# Patient Record
Sex: FEMALE | Race: BLACK OR AFRICAN AMERICAN
[De-identification: names, ages, dates, MRNs, and addresses within clinical notes are randomized per-mention and may not be internally consistent; named-entity substitution may affect disease eponyms.]

---

## 2019-10-02 ENCOUNTER — HOSPITAL ENCOUNTER (INPATIENT)
Dept: HOSPITAL 74 - YASAS | Age: 44
LOS: 9 days | Discharge: HOME | DRG: 772 | End: 2019-10-11
Attending: NEUROMUSCULOSKELETAL MEDICINE & OMM | Admitting: NEUROMUSCULOSKELETAL MEDICINE & OMM
Payer: COMMERCIAL

## 2019-10-02 VITALS — BODY MASS INDEX: 22.3 KG/M2

## 2019-10-02 DIAGNOSIS — F12.20: ICD-10-CM

## 2019-10-02 DIAGNOSIS — F32.9: ICD-10-CM

## 2019-10-02 DIAGNOSIS — F11.20: Primary | ICD-10-CM

## 2019-10-02 DIAGNOSIS — C34.90: ICD-10-CM

## 2019-10-02 DIAGNOSIS — R21: ICD-10-CM

## 2019-10-02 PROCEDURE — HZ42ZZZ GROUP COUNSELING FOR SUBSTANCE ABUSE TREATMENT, COGNITIVE-BEHAVIORAL: ICD-10-PCS | Performed by: ALLERGY & IMMUNOLOGY

## 2019-10-02 RX ADMIN — NICOTINE POLACRILEX PRN MG: 2 GUM, CHEWING ORAL at 21:12

## 2019-10-02 RX ADMIN — HYDROXYZINE PAMOATE PRN MG: 25 CAPSULE ORAL at 21:11

## 2019-10-02 RX ADMIN — Medication SCH MG: at 21:11

## 2019-10-02 NOTE — HP
CIWA Score





- Admission Criteria


[facility] Guidelines: Admission for Medically Managed Detox: 


Requires at least one of the followin. CIWA greater than 12


2. Seizures within the past 24 hours


3. Delirium tremens within the past 24 hours


4. Hallucinations within the past 24 hours


5. Acute intervention needed for co  occurring medical disorder


6. Acute intervention needed for co  occurring psychiatric disorder


7. Severe withdrawal that cannot be handled at a lower level of care (continued


    vomiting, continued diarrhea, abnormal vital signs) requiring intravenous


    medication and/or fluids


8. Pregnancy








Admission ROS [facility]





- HPI


Chief Complaint: 





here for rehab


Allergies/Adverse Reactions: 


 Allergies











Allergy/AdvReac Type Severity Reaction Status Date / Time


 


No Known Allergies Allergy   Verified 10/02/19 16:17











History of Present Illness: 





45 yo with stage 4 lung cancer- smoker, with opioid use disorder. Is in a MAT-

methadone program-120mg. Continues to use- 5 bags [facility] heroin/day. Uses occ 

marijuana. Would like to stop marijuana use and heroin use.





Had upper back surgery to repair vetebrae from metastatic cancer. Pt then had 

radiation therapy. Now goes to chemotherapy-(Immunotherapy)  last treatment 

. Next one due this week- pt to reschedule.  PCP- [facility]





Utox: THC, Mop, oxy, MTD.





 





- Ebola screening


Have you traveled outside of the country in the last 21 days: No


Have you had contact with anyone from an Ebola affected area: No





- Review of Systems


Constitutional: No Symptoms Reported


EENT: reports: No Symptoms Reported


Respiratory: reports: No Symptoms reported


Cardiac: reports: No Symptoms Reported


GI: reports: No Symptoms Reported


: reports: No Symptoms Reported


Musculoskeletal: reports: No Symptoms Reported


Integumentary: reports: No Symptoms Reported


Neuro: reports: No Symptoms reported


Endocrine: reports: No Symptoms Reported


Hematology: reports: No Symptoms Reported


Psychiatric: reports: No Sypmtoms Reported


Other Systems: Reviewed and Negative





Patient History





- Patient Medical History


Hx Cancer: Yes (lung cancer- stage 4, getting immunotherapy)





- Smoking Cessation


Smoking history: Current every day smoker


Have you smoked in the past 12 months: Yes


Aproximately how many cigarettes per day: 10


Hx Chewing Tobacco Use: Yes


Initiated information on smoking cessation: Yes


'Breaking Loose' booklet given: 10/02/19





- Substances abused


  ** Heroin


Substance route: Inhalation


Frequency: Daily


Amount used: 50 dollars


Age of first use: 37


Date of last use: 19





Admission Physical Exam [facility]





- Vital Signs


Vital Signs: 


 Vital Signs - 24 hr











  10/02/19





  16:16


 


Temperature 98.8 F


 


Pulse Rate 95 H


 


Respiratory 16





Rate 


 


Blood Pressure 129/66














- Physical


General Appearance: Yes: Within Normal Limits


HEENTM: Yes: Within Normal Limits, EOMI, Hearing grossly Normal


Respiratory: Yes: Within Normal Limits, Chest Non-Tender


Neck: Yes: Within Normal Limits, No masses,lesions,Nodules


Cardiology: Yes: Within Normal Limits, Regular Rhythm, Regular Rate


Abdominal: Yes: Within Normal Limits, Normal Bowel Sounds


Back: Yes: Within Normal Limits


Musculoskeletal: Yes: Within Normal Limits, Muscle weakness


Neurological: Yes: Within Normal Limits


Integumentary: Yes: Within Normal Limits


Lymphatic: Yes: Within Normal Limits





- Diagnostic


(1) Opioid use disorder


Current Visit: Yes   Status: Acute   





(2) Lung cancer, primary, with metastasis from lung to other site


Current Visit: Yes   Status: Acute   





Breathalyzer





- Breathalyzer


Breathalyzer: 0





Urine Drug Screen





- Test Device


Lot number: [id number]


Expiration date: 21





- Control


Is test valid?: Yes





- Results


Drug screen NEGATIVE: No


Urine drug screen results: THC-Marijuana, MOP-Opiates, OXY-Oxycodone, MTD-

Methadone





Inpatient Rehab Admission





- Rehab Decision to Admit


Inpatient rehab admission?: Yes





- Initial Determination


Are CD services needed?: Yes


Free of communicable disease: Yes


Not in need of hospitalization: Yes





- Rehab Admission Criteria


Previous failed treatment: Yes


Poor recovery environment: Yes


Comorbidities: Yes


Lacks judgement: Yes


Patient is meeting Inpatient Rehab admission criteria:: Yes (pt in methadone 

program. Uses THC)

## 2019-10-03 LAB
ALBUMIN SERPL-MCNC: 2.8 G/DL (ref 3.4–5)
ALP SERPL-CCNC: 134 U/L (ref 45–117)
ALT SERPL-CCNC: 12 U/L (ref 13–61)
ANION GAP SERPL CALC-SCNC: 6 MMOL/L (ref 8–16)
APPEARANCE UR: (no result)
AST SERPL-CCNC: 9 U/L (ref 15–37)
BACTERIA # UR AUTO: 132.5 /HPF
BILIRUB SERPL-MCNC: 0.3 MG/DL (ref 0.2–1)
BILIRUB UR STRIP.AUTO-MCNC: NEGATIVE MG/DL
BUN SERPL-MCNC: 9.7 MG/DL (ref 7–18)
CALCIUM SERPL-MCNC: 8.8 MG/DL (ref 8.5–10.1)
CASTS URNS QL MICRO: 2 /LPF (ref 0–8)
CHLORIDE SERPL-SCNC: 109 MMOL/L (ref 98–107)
CO2 SERPL-SCNC: 29 MMOL/L (ref 21–32)
COLOR UR: YELLOW
CREAT SERPL-MCNC: 0.8 MG/DL (ref 0.55–1.3)
CRYSTALS URNS QL MICRO: (no result) /HPF
DEPRECATED RDW RBC AUTO: 14.8 % (ref 11.6–15.6)
EPITH CASTS URNS QL MICRO: 22.1 /HPF
GLUCOSE SERPL-MCNC: 78 MG/DL (ref 74–106)
HCT VFR BLD CALC: 37 % (ref 32.4–45.2)
HGB BLD-MCNC: 12.1 GM/DL (ref 10.7–15.3)
KETONES UR QL STRIP: (no result)
LEUKOCYTE ESTERASE UR QL STRIP.AUTO: (no result)
MCH RBC QN AUTO: 29.6 PG (ref 25.7–33.7)
MCHC RBC AUTO-ENTMCNC: 32.6 G/DL (ref 32–36)
MCV RBC: 90.6 FL (ref 80–96)
NITRITE UR QL STRIP: NEGATIVE
PH UR: 6 [PH] (ref 5–8)
PLATELET # BLD AUTO: 186 K/MM3 (ref 134–434)
PMV BLD: 9.7 FL (ref 7.5–11.1)
POTASSIUM SERPLBLD-SCNC: 4.1 MMOL/L (ref 3.5–5.1)
PROT SERPL-MCNC: 5.9 G/DL (ref 6.4–8.2)
PROT UR QL STRIP: (no result)
PROT UR QL STRIP: NEGATIVE
RBC # BLD AUTO: 2 /HPF (ref 0–4)
RBC # BLD AUTO: 4.09 M/MM3 (ref 3.6–5.2)
SODIUM SERPL-SCNC: 143 MMOL/L (ref 136–145)
SP GR UR: 1.02 (ref 1.01–1.03)
UROBILINOGEN UR STRIP-MCNC: 1 MG/DL (ref 0.2–1)
WBC # BLD AUTO: 2.6 K/MM3 (ref 4–10)
WBC # UR AUTO: 10 /HPF (ref 0–5)

## 2019-10-03 RX ADMIN — Medication PRN MG: at 21:02

## 2019-10-03 RX ADMIN — NICOTINE POLACRILEX PRN MG: 2 GUM, CHEWING ORAL at 12:26

## 2019-10-03 RX ADMIN — Medication SCH MG: at 21:02

## 2019-10-03 RX ADMIN — NICOTINE POLACRILEX PRN MG: 2 GUM, CHEWING ORAL at 09:36

## 2019-10-03 RX ADMIN — HYDROXYZINE PAMOATE PRN MG: 25 CAPSULE ORAL at 12:26

## 2019-10-03 RX ADMIN — Medication SCH TAB: at 09:36

## 2019-10-03 RX ADMIN — METHADONE HYDROCHLORIDE SCH MG: 40 TABLET ORAL at 07:59

## 2019-10-03 RX ADMIN — NICOTINE SCH MG: 14 PATCH, EXTENDED RELEASE TRANSDERMAL at 09:36

## 2019-10-03 RX ADMIN — IBUPROFEN PRN MG: 400 TABLET, FILM COATED ORAL at 09:37

## 2019-10-03 RX ADMIN — MIRTAZAPINE SCH MG: 15 TABLET, FILM COATED ORAL at 21:02

## 2019-10-03 RX ADMIN — NICOTINE POLACRILEX PRN MG: 2 GUM, CHEWING ORAL at 21:03
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
## 2019-10-04 RX ADMIN — NICOTINE POLACRILEX PRN MG: 2 GUM, CHEWING ORAL at 21:25

## 2019-10-04 RX ADMIN — HYDROXYZINE PAMOATE PRN MG: 25 CAPSULE ORAL at 21:23

## 2019-10-04 RX ADMIN — METHADONE HYDROCHLORIDE SCH MG: 40 TABLET ORAL at 06:17

## 2019-10-04 RX ADMIN — Medication PRN MG: at 21:24

## 2019-10-04 RX ADMIN — NICOTINE SCH MG: 14 PATCH, EXTENDED RELEASE TRANSDERMAL at 09:32

## 2019-10-04 RX ADMIN — MIRTAZAPINE SCH MG: 15 TABLET, FILM COATED ORAL at 21:23

## 2019-10-04 RX ADMIN — Medication SCH TAB: at 09:32

## 2019-10-04 RX ADMIN — NICOTINE POLACRILEX PRN MG: 2 GUM, CHEWING ORAL at 09:34

## 2019-10-04 RX ADMIN — Medication SCH MG: at 21:23

## 2019-10-04 NOTE — PN
[facility] Progress Note (SOAP)


Subjective: 


Patient has stage 4 lung cancer; receives chemotherapy every 4 weeks. Her 

appointment for chemotherapy, a c-scan, and medical evaluation is due during 

her stay in rehab. Ms. Grossman states that she is up to date with all her 

oncology treatment program and it will not effect her care if she stays the 2 

weeks in rehab. 





Objective: 


 CBC, BMP





 10/03/19 09:00 





 10/03/19 09:00 





 Vital Signs (72 hours)











  10/02/19 10/02/19 10/03/19





  16:16 20:47 00:30


 


Temperature 98.8 F 98.2 F 


 


Pulse Rate 95 H 96 H 


 


Respiratory 16 18 16





Rate   


 


Blood Pressure 129/66 120/64 














  10/03/19 10/03/19 10/04/19





  03:30 07:34 00:30


 


Temperature  97.9 F 


 


Pulse Rate  99 H 


 


Respiratory 16 18 16





Rate   


 


Blood Pressure  130/90 














  10/04/19 10/04/19





  03:30 06:47


 


Temperature  97.8 F


 


Pulse Rate  87


 


Respiratory 16 18





Rate  


 


Blood Pressure  122/83











10/04/19 10:00





10/04/19 10:01


P/E: 


General: No apparent distress, A+O


HEENTM: normocephalic, 


Neck: supple


skin: clear


Lungs: clear


Heart: regular


Neuro: no neurological deficits, Cn 2-12 intact. 


Assessment: 


Stage 4 lung cancer


10/04/19 10:02





Plan: 


Offered the patient early discharge with an opportunity to return to rehab in 

order to keep her oncology appointment. Consulted with Ms. Yadav about the 

patient, who agreed that patient could be discharged to attend her oncology 

appointment and return to be re-admitted. Patient refused the offer and stated 

that she wanted to complete her 2 week rehab program. Advised patient that if 

any point during her stay, she wanted to arrange to keep her oncology 

appointment to please let her counselor know.  Asked patient to speak to her 

counselor about this issue and accompanied patient to counselor's office. 

Informed Ms. Alberto RN of the same.

## 2019-10-05 RX ADMIN — NICOTINE POLACRILEX PRN MG: 2 GUM, CHEWING ORAL at 09:30

## 2019-10-05 RX ADMIN — Medication SCH MG: at 21:38

## 2019-10-05 RX ADMIN — Medication SCH TAB: at 09:30

## 2019-10-05 RX ADMIN — MIRTAZAPINE SCH MG: 15 TABLET, FILM COATED ORAL at 21:37

## 2019-10-05 RX ADMIN — NICOTINE SCH MG: 14 PATCH, EXTENDED RELEASE TRANSDERMAL at 09:29

## 2019-10-05 RX ADMIN — Medication PRN MG: at 21:38

## 2019-10-05 RX ADMIN — METHADONE HYDROCHLORIDE SCH MG: 40 TABLET ORAL at 06:41

## 2019-10-06 RX ADMIN — NICOTINE SCH MG: 14 PATCH, EXTENDED RELEASE TRANSDERMAL at 09:45

## 2019-10-06 RX ADMIN — NICOTINE POLACRILEX PRN MG: 2 GUM, CHEWING ORAL at 21:45

## 2019-10-06 RX ADMIN — Medication PRN MG: at 21:44

## 2019-10-06 RX ADMIN — METHADONE HYDROCHLORIDE SCH MG: 40 TABLET ORAL at 07:00

## 2019-10-06 RX ADMIN — MIRTAZAPINE SCH MG: 15 TABLET, FILM COATED ORAL at 21:44

## 2019-10-06 RX ADMIN — NICOTINE POLACRILEX PRN MG: 2 GUM, CHEWING ORAL at 09:46

## 2019-10-06 RX ADMIN — Medication SCH TAB: at 09:45

## 2019-10-06 RX ADMIN — Medication SCH MG: at 21:44

## 2019-10-06 RX ADMIN — HYDROXYZINE PAMOATE PRN MG: 25 CAPSULE ORAL at 21:45

## 2019-10-07 RX ADMIN — HYDROCORTISONE SCH APPLIC: 0.5 CREAM TOPICAL at 21:21

## 2019-10-07 RX ADMIN — CYCLOBENZAPRINE HYDROCHLORIDE PRN MG: 10 TABLET, FILM COATED ORAL at 16:49

## 2019-10-07 RX ADMIN — Medication SCH TAB: at 09:55

## 2019-10-07 RX ADMIN — NICOTINE POLACRILEX PRN MG: 2 GUM, CHEWING ORAL at 09:58

## 2019-10-07 RX ADMIN — HYDROXYZINE PAMOATE PRN MG: 25 CAPSULE ORAL at 21:19

## 2019-10-07 RX ADMIN — HYDROXYZINE PAMOATE PRN MG: 25 CAPSULE ORAL at 13:01

## 2019-10-07 RX ADMIN — Medication SCH MG: at 21:19

## 2019-10-07 RX ADMIN — IBUPROFEN PRN MG: 400 TABLET, FILM COATED ORAL at 21:20

## 2019-10-07 RX ADMIN — MIRTAZAPINE SCH MG: 15 TABLET, FILM COATED ORAL at 21:19

## 2019-10-07 RX ADMIN — NICOTINE POLACRILEX PRN MG: 2 GUM, CHEWING ORAL at 13:02

## 2019-10-07 RX ADMIN — NICOTINE SCH MG: 14 PATCH, EXTENDED RELEASE TRANSDERMAL at 09:55

## 2019-10-07 RX ADMIN — Medication PRN MG: at 21:19

## 2019-10-07 RX ADMIN — NICOTINE POLACRILEX PRN MG: 2 GUM, CHEWING ORAL at 21:20

## 2019-10-07 RX ADMIN — METHADONE HYDROCHLORIDE SCH MG: 40 TABLET ORAL at 06:40

## 2019-10-07 NOTE — PN
[facility] Progress Note


Note: 





 Laboratory Tests











  10/02/19 10/03/19 10/03/19





  17:06 08:00 09:00


 


WBC    2.6 L


 


RBC    4.09


 


Hgb    12.1


 


Hct    37.0


 


MCV    90.6


 


MCH    29.6


 


MCHC    32.6


 


RDW    14.8


 


Plt Count    186


 


MPV    9.7


 


Sodium   


 


Potassium   


 


Chloride   


 


Carbon Dioxide   


 


Anion Gap   


 


BUN   


 


Creatinine   


 


Est GFR (CKD-EPI)AfAm   


 


Est GFR (CKD-EPI)NonAf   


 


Random Glucose   


 


Calcium   


 


Total Bilirubin   


 


AST   


 


ALT   


 


Alkaline Phosphatase   


 


Total Protein   


 


Albumin   


 


Urine Color   Yellow 


 


Urine Appearance   Cloudy 


 


Urine pH   6.0 


 


Ur Specific Gravity   1.022 


 


Urine Protein   1+ H 


 


Urine Glucose (UA)   Negative 


 


Urine Ketones   Trace H 


 


Urine Blood   3+ H 


 


Urine Nitrite   Negative 


 


Urine Bilirubin   Negative 


 


Urine Urobilinogen   1.0 


 


Ur Leukocyte Esterase   1+ H 


 


Urine WBC (Auto)   10 


 


Urine RBC (Auto)   2 


 


Urine Casts (Auto)   2 


 


U Pathogenic Cast Auto   None seen 


 


U Epithel Cells (Auto)   22.1 


 


Urine Crystals (Auto)   Calcium oxalate 


 


Urine Bacteria (Auto)   132.5 


 


POC Urine HCG, Qual  Negative  


 


RPR Titer   














  10/03/19 10/03/19





  09:00 09:00


 


WBC  


 


RBC  


 


Hgb  


 


Hct  


 


MCV  


 


MCH  


 


MCHC  


 


RDW  


 


Plt Count  


 


MPV  


 


Sodium  143 


 


Potassium  4.1 


 


Chloride  109 H 


 


Carbon Dioxide  29 


 


Anion Gap  6 L 


 


BUN  9.7 


 


Creatinine  0.8 


 


Est GFR (CKD-EPI)AfAm  103.92 


 


Est GFR (CKD-EPI)NonAf  89.66 


 


Random Glucose  78 


 


Calcium  8.8 


 


Total Bilirubin  0.3 


 


AST  9 L 


 


ALT  12 L 


 


Alkaline Phosphatase  134 H 


 


Total Protein  5.9 L 


 


Albumin  2.8 L 


 


Urine Color  


 


Urine Appearance  


 


Urine pH  


 


Ur Specific Gravity  


 


Urine Protein  


 


Urine Glucose (UA)  


 


Urine Ketones  


 


Urine Blood  


 


Urine Nitrite  


 


Urine Bilirubin  


 


Urine Urobilinogen  


 


Ur Leukocyte Esterase  


 


Urine WBC (Auto)  


 


Urine RBC (Auto)  


 


Urine Casts (Auto)  


 


U Pathogenic Cast Auto  


 


U Epithel Cells (Auto)  


 


Urine Crystals (Auto)  


 


Urine Bacteria (Auto)  


 


POC Urine HCG, Qual  


 


RPR Titer   Nonreactive








 Vital Signs











Temperature  97.9 F   10/07/19 07:13


 


Pulse Rate  97 H  10/07/19 07:13


 


Respiratory Rate  18   10/07/19 07:13


 


Blood Pressure  127/87   10/07/19 07:13


 


O2 Sat by Pulse Oximetry (%)      








Patient c/o itching and rash to arms.





ROS : +itching








PE:





alert and oriented x 3





skin warm and dry, +macular red rash to arms





no open areas








A/P:





UA + blood





Rash








aveeno soap





hydrocortisone cream





repeat ua in am

## 2019-10-08 LAB
APPEARANCE UR: (no result)
BILIRUB UR STRIP.AUTO-MCNC: NEGATIVE MG/DL
COLOR UR: YELLOW
KETONES UR QL STRIP: NEGATIVE
LEUKOCYTE ESTERASE UR QL STRIP.AUTO: NEGATIVE
NITRITE UR QL STRIP: NEGATIVE
PH UR: 8 [PH] (ref 5–8)
PROT UR QL STRIP: NEGATIVE
PROT UR QL STRIP: NEGATIVE
SP GR UR: 1.02 (ref 1.01–1.03)
UROBILINOGEN UR STRIP-MCNC: 0.2 MG/DL (ref 0.2–1)

## 2019-10-08 RX ADMIN — Medication SCH TAB: at 09:50

## 2019-10-08 RX ADMIN — MIRTAZAPINE SCH MG: 15 TABLET, FILM COATED ORAL at 21:18

## 2019-10-08 RX ADMIN — HYDROCORTISONE SCH: 0.5 CREAM TOPICAL at 09:50

## 2019-10-08 RX ADMIN — HYDROXYZINE PAMOATE PRN MG: 25 CAPSULE ORAL at 09:52

## 2019-10-08 RX ADMIN — CYCLOBENZAPRINE HYDROCHLORIDE PRN MG: 10 TABLET, FILM COATED ORAL at 17:50

## 2019-10-08 RX ADMIN — HYDROXYZINE PAMOATE PRN MG: 25 CAPSULE ORAL at 22:37

## 2019-10-08 RX ADMIN — CYCLOBENZAPRINE HYDROCHLORIDE PRN MG: 10 TABLET, FILM COATED ORAL at 09:52

## 2019-10-08 RX ADMIN — NICOTINE SCH MG: 14 PATCH, EXTENDED RELEASE TRANSDERMAL at 09:50

## 2019-10-08 RX ADMIN — Medication SCH MG: at 21:18

## 2019-10-08 RX ADMIN — Medication PRN MG: at 21:18

## 2019-10-08 RX ADMIN — NICOTINE POLACRILEX PRN MG: 2 GUM, CHEWING ORAL at 09:53

## 2019-10-08 RX ADMIN — HYDROXYZINE PAMOATE PRN MG: 25 CAPSULE ORAL at 17:50

## 2019-10-08 RX ADMIN — HYDROCORTISONE SCH: 0.5 CREAM TOPICAL at 21:19

## 2019-10-08 RX ADMIN — METHADONE HYDROCHLORIDE SCH MG: 40 TABLET ORAL at 06:34

## 2019-10-09 RX ADMIN — NICOTINE POLACRILEX PRN MG: 2 GUM, CHEWING ORAL at 18:43

## 2019-10-09 RX ADMIN — Medication SCH TAB: at 09:40

## 2019-10-09 RX ADMIN — CYCLOBENZAPRINE HYDROCHLORIDE PRN MG: 10 TABLET, FILM COATED ORAL at 18:42

## 2019-10-09 RX ADMIN — HYDROXYZINE PAMOATE PRN MG: 25 CAPSULE ORAL at 17:33

## 2019-10-09 RX ADMIN — NICOTINE SCH MG: 14 PATCH, EXTENDED RELEASE TRANSDERMAL at 09:40

## 2019-10-09 RX ADMIN — HYDROCORTISONE SCH APPLIC: 0.5 CREAM TOPICAL at 09:41

## 2019-10-09 RX ADMIN — NICOTINE POLACRILEX PRN MG: 2 GUM, CHEWING ORAL at 21:32

## 2019-10-09 RX ADMIN — NICOTINE POLACRILEX PRN MG: 2 GUM, CHEWING ORAL at 09:44

## 2019-10-09 RX ADMIN — HYDROXYZINE PAMOATE PRN MG: 25 CAPSULE ORAL at 21:29

## 2019-10-09 RX ADMIN — HYDROCORTISONE SCH APPLIC: 0.5 CREAM TOPICAL at 21:36

## 2019-10-09 RX ADMIN — CYCLOBENZAPRINE HYDROCHLORIDE PRN MG: 10 TABLET, FILM COATED ORAL at 09:43

## 2019-10-09 RX ADMIN — HYDROXYZINE PAMOATE PRN MG: 25 CAPSULE ORAL at 09:43

## 2019-10-09 RX ADMIN — METHADONE HYDROCHLORIDE SCH MG: 40 TABLET ORAL at 06:22

## 2019-10-09 RX ADMIN — Medication SCH MG: at 21:30

## 2019-10-09 RX ADMIN — Medication PRN MG: at 21:29

## 2019-10-09 RX ADMIN — MIRTAZAPINE SCH MG: 15 TABLET, FILM COATED ORAL at 21:29

## 2019-10-10 RX ADMIN — NICOTINE POLACRILEX PRN MG: 2 GUM, CHEWING ORAL at 19:01

## 2019-10-10 RX ADMIN — HYDROXYZINE PAMOATE PRN MG: 25 CAPSULE ORAL at 19:00

## 2019-10-10 RX ADMIN — Medication SCH TAB: at 09:43

## 2019-10-10 RX ADMIN — CYCLOBENZAPRINE HYDROCHLORIDE PRN MG: 10 TABLET, FILM COATED ORAL at 19:00

## 2019-10-10 RX ADMIN — HYDROCORTISONE SCH: 0.5 CREAM TOPICAL at 09:44

## 2019-10-10 RX ADMIN — NICOTINE POLACRILEX PRN MG: 2 GUM, CHEWING ORAL at 09:46

## 2019-10-10 RX ADMIN — HYDROCORTISONE SCH: 0.5 CREAM TOPICAL at 22:09

## 2019-10-10 RX ADMIN — Medication SCH MG: at 21:24

## 2019-10-10 RX ADMIN — NICOTINE POLACRILEX PRN MG: 2 GUM, CHEWING ORAL at 21:25

## 2019-10-10 RX ADMIN — HYDROXYZINE PAMOATE PRN MG: 25 CAPSULE ORAL at 09:47

## 2019-10-10 RX ADMIN — NICOTINE SCH MG: 14 PATCH, EXTENDED RELEASE TRANSDERMAL at 09:43

## 2019-10-10 RX ADMIN — METHADONE HYDROCHLORIDE SCH MG: 40 TABLET ORAL at 06:31

## 2019-10-10 RX ADMIN — CYCLOBENZAPRINE HYDROCHLORIDE PRN MG: 10 TABLET, FILM COATED ORAL at 09:45

## 2019-10-10 RX ADMIN — MIRTAZAPINE SCH MG: 15 TABLET, FILM COATED ORAL at 21:24

## 2019-10-10 RX ADMIN — Medication PRN MG: at 21:24

## 2019-10-11 VITALS — TEMPERATURE: 97.6 F | SYSTOLIC BLOOD PRESSURE: 131 MMHG | DIASTOLIC BLOOD PRESSURE: 86 MMHG | HEART RATE: 100 BPM

## 2019-10-11 RX ADMIN — METHADONE HYDROCHLORIDE SCH MG: 40 TABLET ORAL at 06:21

## 2019-10-11 NOTE — DS
[facility] Rehab Discharge Summary





- [facility] Rehab Discharge Summary


Admission Date: 10/02/19


Discharge Date: 10/11/19





- History


Present History: Cannabis dependence, MMTP, Opioid dependence


Additional Comments: 





Pt is a 45 y/o female admitted to rehab with LUISANA and discharged today. Pt will 

be following up with her MMTP at [facility] to continue 

maintenance. Pt reports she has a primary care doctor Dr. Forte for  medical 

management as well as oncology care at [place], [place]. Reports 

her next appointment is on 10/16/19. 


Pertinent Past History: 





Hx Lung Cancer


MDD





- Discharge Physical Exam


Vital Signs: 


 Vital Signs











Temperature  97.6 F   10/11/19 07:18


 


Pulse Rate  100 H  10/11/19 07:18


 


Respiratory Rate  18   10/11/19 07:18


 


Blood Pressure  131/86   10/11/19 07:18


 


O2 Sat by Pulse Oximetry (%)      








Alert o x 3


nad


oob 


Cardiac:s1 s2, rrr


Lungs:cta,ravi. 


Abdomen:soft,+bs,nt,nd


Extremities/Skin:no edema,Full ROM,skin intact.





Pertinent Admission Physical Exam Findings: 





 Laboratory Tests











  10/02/19 10/03/19 10/03/19





  17:06 08:00 09:00


 


WBC    2.6 L


 


RBC    4.09


 


Hgb    12.1


 


Hct    37.0


 


MCV    90.6


 


MCH    29.6


 


MCHC    32.6


 


RDW    14.8


 


Plt Count    186


 


MPV    9.7


 


Sodium   


 


Potassium   


 


Chloride   


 


Carbon Dioxide   


 


Anion Gap   


 


BUN   


 


Creatinine   


 


Est GFR (CKD-EPI)AfAm   


 


Est GFR (CKD-EPI)NonAf   


 


Random Glucose   


 


Calcium   


 


Total Bilirubin   


 


AST   


 


ALT   


 


Alkaline Phosphatase   


 


Total Protein   


 


Albumin   


 


Urine Color   Yellow 


 


Urine Appearance   Cloudy 


 


Urine pH   6.0 


 


Ur Specific Gravity   1.022 


 


Urine Protein   1+ H 


 


Urine Glucose (UA)   Negative 


 


Urine Ketones   Trace H 


 


Urine Blood   3+ H 


 


Urine Nitrite   Negative 


 


Urine Bilirubin   Negative 


 


Urine Urobilinogen   1.0 


 


Ur Leukocyte Esterase   1+ H 


 


Urine WBC (Auto)   10 


 


Urine RBC (Auto)   2 


 


Urine Casts (Auto)   2 


 


U Pathogenic Cast Auto   None seen 


 


U Epithel Cells (Auto)   22.1 


 


Urine Crystals (Auto)   Calcium oxalate 


 


Urine Bacteria (Auto)   132.5 


 


POC Urine HCG, Qual  Negative  


 


RPR Titer   














  10/03/19 10/03/19 10/08/19





  09:00 09:00 10:15


 


WBC   


 


RBC   


 


Hgb   


 


Hct   


 


MCV   


 


MCH   


 


MCHC   


 


RDW   


 


Plt Count   


 


MPV   


 


Sodium  143  


 


Potassium  4.1  


 


Chloride  109 H  


 


Carbon Dioxide  29  


 


Anion Gap  6 L  


 


BUN  9.7  


 


Creatinine  0.8  


 


Est GFR (CKD-EPI)AfAm  103.92  


 


Est GFR (CKD-EPI)NonAf  89.66  


 


Random Glucose  78  


 


Calcium  8.8  


 


Total Bilirubin  0.3  


 


AST  9 L  


 


ALT  12 L  


 


Alkaline Phosphatase  134 H  


 


Total Protein  5.9 L  


 


Albumin  2.8 L  


 


Urine Color    Yellow


 


Urine Appearance    Cloudy


 


Urine pH    8.0  D


 


Ur Specific Gravity    1.020


 


Urine Protein    Negative


 


Urine Glucose (UA)    Negative


 


Urine Ketones    Negative


 


Urine Blood    Negative


 


Urine Nitrite    Negative


 


Urine Bilirubin    Negative


 


Urine Urobilinogen    0.2


 


Ur Leukocyte Esterase    Negative


 


Urine WBC (Auto)   


 


Urine RBC (Auto)   


 


Urine Casts (Auto)   


 


U Pathogenic Cast Auto   


 


U Epithel Cells (Auto)   


 


Urine Crystals (Auto)   


 


Urine Bacteria (Auto)   


 


POC Urine HCG, Qual   


 


RPR Titer   Nonreactive 








Unremarkable





- Treatment


Discharge Condition: Discharge condition good


Hospital Course: 





Rehabilitated safely and responded well


CD aftercare referral accepted





- Medication


Discharge Medications: 


Ambulatory Orders





Mirtazapine [Remeron -] 15 mg PO HS #30 tablet 10/11/19 











- Medication-Assisted Treatment (MAT)


Medication-Assisted Treatment (MAT): No





- Discharge Instructions


Diet, activity, other medical instructions: 





Diet:Regular





Activity: oob ad maria del rosario





Other medical instructions:Follow up with CD aftercare @ [facility] on 

[address] [place] [address].


follow up with primary care doctor/medical management at [facility] as scheduled on 10/16/19.








- Follow-up Referral


Minutes to complete discharge: 30





- AMA


Did Patient Leave Against Medical Advice: No

## 2019-10-11 NOTE — PN
[facility] Progress Note


Note: 





Patient is discharged today. Script for 30 days supply of Remeron 15 mg/hs is 

electronically transmitted to [facility] at [address] [place], [place] [id number]